# Patient Record
Sex: MALE | Race: WHITE | NOT HISPANIC OR LATINO | Employment: FULL TIME | URBAN - METROPOLITAN AREA
[De-identification: names, ages, dates, MRNs, and addresses within clinical notes are randomized per-mention and may not be internally consistent; named-entity substitution may affect disease eponyms.]

---

## 2017-02-21 RX ORDER — SUMATRIPTAN 100 MG/1
TABLET, FILM COATED ORAL
Qty: 10 TAB | Refills: 4 | Status: SHIPPED | OUTPATIENT
Start: 2017-02-21 | End: 2017-08-04 | Stop reason: SDUPTHER

## 2017-08-04 ENCOUNTER — OFFICE VISIT (OUTPATIENT)
Dept: MEDICAL GROUP | Facility: MEDICAL CENTER | Age: 42
End: 2017-08-04
Payer: COMMERCIAL

## 2017-08-04 VITALS
OXYGEN SATURATION: 98 % | BODY MASS INDEX: 22.9 KG/M2 | HEART RATE: 83 BPM | WEIGHT: 160 LBS | SYSTOLIC BLOOD PRESSURE: 116 MMHG | TEMPERATURE: 98.1 F | HEIGHT: 70 IN | RESPIRATION RATE: 16 BRPM | DIASTOLIC BLOOD PRESSURE: 74 MMHG

## 2017-08-04 DIAGNOSIS — K21.9 GASTROESOPHAGEAL REFLUX DISEASE WITHOUT ESOPHAGITIS: ICD-10-CM

## 2017-08-04 DIAGNOSIS — B35.4 TINEA CORPORIS: ICD-10-CM

## 2017-08-04 DIAGNOSIS — G43.109 MIGRAINE WITH AURA AND WITHOUT STATUS MIGRAINOSUS, NOT INTRACTABLE: ICD-10-CM

## 2017-08-04 PROCEDURE — 99214 OFFICE O/P EST MOD 30 MIN: CPT | Performed by: FAMILY MEDICINE

## 2017-08-04 RX ORDER — KETOCONAZOLE 20 MG/G
1 CREAM TOPICAL 2 TIMES DAILY
Qty: 1 TUBE | Refills: 2 | Status: SHIPPED | OUTPATIENT
Start: 2017-08-04

## 2017-08-04 RX ORDER — SUMATRIPTAN 100 MG/1
100 TABLET, FILM COATED ORAL
Qty: 10 TAB | Refills: 5 | Status: SHIPPED | OUTPATIENT
Start: 2017-08-04 | End: 2018-04-03 | Stop reason: SDUPTHER

## 2017-08-04 RX ORDER — OMEPRAZOLE 40 MG/1
40 CAPSULE, DELAYED RELEASE ORAL DAILY
Qty: 30 CAP | Refills: 0 | Status: SHIPPED | OUTPATIENT
Start: 2017-08-04

## 2017-08-04 ASSESSMENT — PATIENT HEALTH QUESTIONNAIRE - PHQ9
SUM OF ALL RESPONSES TO PHQ QUESTIONS 1-9: 3
5. POOR APPETITE OR OVEREATING: 0 - NOT AT ALL
CLINICAL INTERPRETATION OF PHQ2 SCORE: 1

## 2017-08-04 NOTE — MR AVS SNAPSHOT
"        Mickey Jamison   2017 10:20 AM   Office Visit   MRN: [id number]    Department:  [facility]   Dept Phone:  [phone]    Description:  Male : 1975   Provider:  Silvino Llanos M.D.           Reason for Visit     Medication Refill     Rash           Allergies as of 2017     No Known Allergies      You were diagnosed with     Migraine with aura and without status migrainosus, not intractable   [639336]       Gastroesophageal reflux disease without esophagitis   [592398]       Tinea corporis   [540323]         Vital Signs     Blood Pressure Pulse Temperature Respirations Height Weight    116/74 mmHg 83 36.7 °C (98.1 °F) 16 1.78 m (5' 10.08\") 72.576 kg (160 lb)    Body Mass Index Oxygen Saturation Smoking Status             22.91 kg/m2 98% Never Smoker          Basic Information     Date Of Birth Sex Race Ethnicity Preferred Language    1975 Male White Non- English      Problem List              ICD-10-CM Priority Class Noted - Resolved    Migraine with aura and without status migrainosus, not intractable G43.109   2016 - Present    Tension headache G44.209   2016 - Present    Nasal polyp J33.9   2016 - Present    Seasonal allergies J30.2   2016 - Present    Muscle spasms of neck M62.838   2016 - Present    Gastroesophageal reflux disease without esophagitis K21.9   2017 - Present    Tinea corporis B35.4   2017 - Present      Health Maintenance        Date Due Completion Dates    IMM DTaP/Tdap/Td Vaccine (1 - Tdap) 1996 ---    IMM INFLUENZA (1) 2017 ---            Current Immunizations     No immunizations on file.      Below and/or attached are the medications your provider expects you to take. Review all of your home medications and newly ordered medications with your provider and/or pharmacist. Follow medication instructions as directed by your provider and/or pharmacist. Please keep your medication list with you and share with " your provider. Update the information when medications are discontinued, doses are changed, or new medications (including over-the-counter products) are added; and carry medication information at all times in the event of emergency situations     Allergies:  No Known Allergies          Medications  Valid as of: August 04, 2017 - 11:02 AM    Generic Name Brand Name Tablet Size Instructions for use    Fluticasone Propionate (Suspension) FLONASE 50 MCG/ACT Spray 2 Sprays in nose every day.        Ibuprofen (Tab) MOTRIN 600 MG PRN         Ketoconazole (Cream) NIZORAL 2 % Apply 1 Application to affected area(s) 2 times a day.        Omeprazole (CAPSULE DELAYED RELEASE) PRILOSEC 40 MG Take 1 Cap by mouth every day.        SUMAtriptan Succinate (Tab) IMITREX 100 MG Take 1 Tab by mouth 1 time daily as needed for Migraine.        TiZANidine HCl (Tab) ZANAFLEX 4 MG TAKE 1 TABLET BY MOUTH 2 TIMES A DAY AS NEEDED (MUSCLE SPASMS).        .                 Medicines prescribed today were sent to:     [address] [address]    Phone: [phone] Fax: [phone]    Open 24 Hours?: No      Medication refill instructions:       If your prescription bottle indicates you have medication refills left, it is not necessary to call your provider’s office. Please contact your pharmacy and they will refill your medication.    If your prescription bottle indicates you do not have any refills left, you may request refills at any time through one of the following ways: The online [employer] system (except Urgent Care), by calling your provider’s office, or by asking your pharmacy to contact your provider’s office with a refill request. Medication refills are processed only during regular business hours and may not be available until the next business day. Your provider may request additional information or to have a follow-up visit with you prior to refilling your  medication.   *Please Note: Medication refills are assigned a new Rx number when refilled electronically. Your pharmacy may indicate that no refills were authorized even though a new prescription for the same medication is available at the pharmacy. Please request the medicine by name with the pharmacy before contacting your provider for a refill.           [employer] Access Code: [email or web address]  Expires: 9/3/2017 10:20 AM    [employer]  A secure, online tool to manage your health information     [employer]’s [employer]® is a secure, online tool that connects you to your personalized health information from the privacy of your home -- day or night - making it very easy for you to manage your healthcare. Once the activation process is completed, you can even access your medical information using the [facility] [employer] carlos, which is available for free in the Apple Carlos store or Google Play store.     [employer] provides the following levels of access (as shown below):   My Chart Features   [facility] Primary [facility] Doctor [facility]  Specialists [facility]  Urgent  Care Non-[facility]  Primary Care  Doctor   Email your healthcare team securely and privately 24/7 X X X    Manage appointments: schedule your next appointment; view details of past/upcoming appointments X      Request prescription refills. X      View recent personal medical records, including lab and immunizations X X X X   View health record, including health history, allergies, medications X X X X   Read reports about your outpatient visits, procedures, consult and ER notes X X X X   See your discharge summary, which is a recap of your hospital and/or ER visit that includes your diagnosis, lab results, and care plan. X X       How to register for [employer]:  1. Go to  [email or web address].  2. Click on the Sign Up Now box, which takes you to the New Member Sign Up page. You will need to provide the following information:  a. Enter your [facility] [employer] Access Code exactly as it appears at the  top of this page. (You will not need to use this code after you’ve completed the sign-up process. If you do not sign up before the expiration date, you must request a new code.)   b. Enter your date of birth.   c. Enter your home email address.   d. Click Submit, and follow the next screen’s instructions.  3. Create a [facility] ID. This will be your [facility] login ID and cannot be changed, so think of one that is secure and easy to remember.  4. Create a [facility] password. You can change your password at any time.  5. Enter your Password Reset Question and Answer. This can be used at a later time if you forget your password.   6. Enter your e-mail address. This allows you to receive e-mail notifications when new information is available in [facility].  7. Click Sign Up. You can now view your health information.    For assistance activating your [facility] account, call [phone]

## 2017-08-04 NOTE — PROGRESS NOTES
Subjective:   Mickey Jamison is a 41 y.o. male here today for migraines, GERD, tinea    Migraine with aura and without status migrainosus, not intractable  Using Imitrex 100 mg, half a tablet, 1-2 times per week. There are weeks where he takes it almost daily.  Biggest trigger is not eating on time and tension in neck, related to stress.  Gets pulsing in his eyes from headache.    Tinea corporis  Patient has been affected by tinea in the past, treated with an unknown type of cream. He states he is having recurrent symptoms, but not as severe as previously.    Gastroesophageal reflux disease without esophagitis  Patient has been having increased heartburn over the last 3 weeks. He states that usually he has very mild symptoms that come and go. However recently he also has been having dysphagia that has required him to drink water to get food down the esophagus sometimes. He denies any dark stools or bloody stools or vomiting. Patient is using ranitidine, over-the-counter, which is helping slightly. He is to sit for H. pylori approximately 15 years ago when he had similar symptoms and he was negative.         Current medicines (including changes today)  Current Outpatient Prescriptions   Medication Sig Dispense Refill   • omeprazole (PRILOSEC) 40 MG delayed-release capsule Take 1 Cap by mouth every day. 30 Cap 0   • sumatriptan (IMITREX) 100 MG tablet Take 1 Tab by mouth 1 time daily as needed for Migraine. 10 Tab 5   • ketoconazole (NIZORAL) 2 % Cream Apply 1 Application to affected area(s) 2 times a day. 1 Tube 2   • tizanidine (ZANAFLEX) 4 MG Tab TAKE 1 TABLET BY MOUTH 2 TIMES A DAY AS NEEDED (MUSCLE SPASMS). 60 Tab 5   • fluticasone (FLONASE) 50 MCG/ACT nasal spray Spray 2 Sprays in nose every day. 16 g 5   • IBUPROFEN 600 MG PO TABS PRN        No current facility-administered medications for this visit.     He  has a past medical history of Tinea corporis (8/4/2017). He also has no past medical history of  "Congestive heart failure (CMS-[facility]), Cancer (CMS-HCC), COPD, Diabetes, ASTHMA, CAD (coronary artery disease), Stroke (CMS-[facility]), Seizure disorder (CMS-[facility]), Hypertension, Renal disorder, Liver disease, or Psychiatric disorder.    ROS   No chest pain, no shortness of breath, no abdominal pain       Objective:     Blood pressure 116/74, pulse 83, temperature 36.7 °C (98.1 °F), resp. rate 16, height 1.78 m (5' 10.08\"), weight 72.576 kg (160 lb), SpO2 98 %. Body mass index is 22.91 kg/(m^2).   Physical Exam:  Constitutional: Alert, no distress.  Skin: Warm, dry, good turgor, circular erythematous patch on left flank with raised borders. No drainage or discharge.  Eye: Equal, round and reactive, conjunctiva clear, lids normal.  ENMT: Lips without lesions, good dentition, oropharynx clear.  Psych: Alert and oriented x3, normal affect and mood.        Assessment and Plan:   The following treatment plan was discussed    1. Migraine with aura and without status migrainosus, not intractable  Controlled. Cautioned patient overuse of Imitrex. Refill sent to pharmacy.  - sumatriptan (IMITREX) 100 MG tablet; Take 1 Tab by mouth 1 time daily as needed for Migraine.  Dispense: 10 Tab; Refill: 5    2. Gastroesophageal reflux disease without esophagitis  Trial of omeprazole 40 mg daily for the next 3-4 weeks. If no improvement consider H. pylori test. If negative, consider GI referral.  If he has difficulty coming off of omeprazole 40 mg daily, consider decrease him to omeprazole 20 mg daily.  - omeprazole (PRILOSEC) 40 MG delayed-release capsule; Take 1 Cap by mouth every day.  Dispense: 30 Cap; Refill: 0    3. Tinea corporis  Prescription for ketoconazole.  - ketoconazole (NIZORAL) 2 % Cream; Apply 1 Application to affected area(s) 2 times a day.  Dispense: 1 Tube; Refill: 2      Followup: Return if symptoms worsen or fail to improve.           "

## 2017-08-04 NOTE — ASSESSMENT & PLAN NOTE
Using Imitrex 100 mg, half a tablet, 1-2 times per week. There are weeks where he takes it almost daily.  Biggest trigger is not eating on time and tension in neck, related to stress.  Gets pulsing in his eyes from headache.

## 2017-08-04 NOTE — ASSESSMENT & PLAN NOTE
Patient has been having increased heartburn over the last 3 weeks. He states that usually he has very mild symptoms that come and go. However recently he also has been having dysphagia that has required him to drink water to get food down the esophagus sometimes. He denies any dark stools or bloody stools or vomiting. Patient is using ranitidine, over-the-counter, which is helping slightly. He is to sit for H. pylori approximately 15 years ago when he had similar symptoms and he was negative.

## 2017-08-04 NOTE — ASSESSMENT & PLAN NOTE
Patient has been affected by tinea in the past, treated with an unknown type of cream. He states he is having recurrent symptoms, but not as severe as previously.

## 2018-04-03 ENCOUNTER — OFFICE VISIT (OUTPATIENT)
Dept: MEDICAL GROUP | Facility: MEDICAL CENTER | Age: 43
End: 2018-04-03
Payer: COMMERCIAL

## 2018-04-03 VITALS
OXYGEN SATURATION: 98 % | SYSTOLIC BLOOD PRESSURE: 112 MMHG | HEART RATE: 82 BPM | BODY MASS INDEX: 23.25 KG/M2 | WEIGHT: 162.4 LBS | RESPIRATION RATE: 12 BRPM | DIASTOLIC BLOOD PRESSURE: 82 MMHG | TEMPERATURE: 97.9 F | HEIGHT: 70 IN

## 2018-04-03 DIAGNOSIS — G43.109 MIGRAINE WITH AURA AND WITHOUT STATUS MIGRAINOSUS, NOT INTRACTABLE: ICD-10-CM

## 2018-04-03 DIAGNOSIS — F41.1 GENERALIZED ANXIETY DISORDER: ICD-10-CM

## 2018-04-03 PROBLEM — F41.9 ANXIETY: Status: ACTIVE | Noted: 2018-04-03

## 2018-04-03 PROCEDURE — 99214 OFFICE O/P EST MOD 30 MIN: CPT | Performed by: FAMILY MEDICINE

## 2018-04-03 RX ORDER — SUMATRIPTAN 100 MG/1
100 TABLET, FILM COATED ORAL
Qty: 10 TAB | Refills: 5 | Status: SHIPPED | OUTPATIENT
Start: 2018-04-03 | End: 2019-05-02 | Stop reason: SDUPTHER

## 2018-04-03 NOTE — PROGRESS NOTES
"Subjective:   Mickey Jamison is a 42 y.o. male here today for anxiety    Generalized anxiety disorder  Has been seeing a counselor for the last 1 years or so.  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 Anxiety has been there for a few years, but increased over the last 1 year. Waking up in the middle of the night and can't go back to sleep. Has a sense of impending doom and nervous all the time. Hard to focus at work.  Has been trying meditation, psychologist, and OTC anxiety medication. Has never been on prescription medication.  No depression. No hallucinations or delusions.         Current medicines (including changes today)  Current Outpatient Prescriptions   Medication Sig Dispense Refill   • sertraline (ZOLOFT) 50 MG Tab Take 1 Tab by mouth every day. 30 Tab 11   • sumatriptan (IMITREX) 100 MG tablet Take 1 Tab by mouth 1 time daily as needed for Migraine. 10 Tab 5   • omeprazole (PRILOSEC) 40 MG delayed-release capsule Take 1 Cap by mouth every day. 30 Cap 0   • ketoconazole (NIZORAL) 2 % Cream Apply 1 Application to affected area(s) 2 times a day. 1 Tube 2   • tizanidine (ZANAFLEX) 4 MG Tab TAKE 1 TABLET BY MOUTH 2 TIMES A DAY AS NEEDED (MUSCLE SPASMS). 60 Tab 5   • fluticasone (FLONASE) 50 MCG/ACT nasal spray Spray 2 Sprays in nose every day. 16 g 5   • IBUPROFEN 600 MG PO TABS PRN        No current facility-administered medications for this visit.      He  has a past medical history of Tinea corporis (8/4/2017). He also has no past medical history of ASTHMA; CAD (coronary artery disease); Cancer (CMS-HCC); Congestive heart failure (CMS-HCC); COPD; Diabetes; Hypertension; Liver disease; Psychiatric disorder; Renal disorder; Seizure disorder (CMS-[facility]); or Stroke (CMS-[facility]).    ROS   No depression, no chest pain       Objective:     Blood pressure 112/82, pulse 82, temperature 36.6 °C (97.9 °F), resp. rate 12, height 1.78 m (5' 10.08\"), " weight 73.7 kg (162 lb 6.4 oz), SpO2 98 %. Body mass index is 23.25 kg/m².   Physical Exam:  Constitutional: Alert, no distress.  Skin: Warm, dry, good turgor, no rashes in visible areas.  Eye: Equal, round and reactive, conjunctiva clear, lids normal.  Psych: Alert and oriented x3, anxious.        Assessment and Plan:   The following treatment plan was discussed    1. Generalized anxiety disorder  New problem. Start patient on sertraline 50 mg daily. Reviewed medication and treatment options in full detail. Discussed potential side effects. Patient will sign up for my chart and message me in one month to notify health symptoms are improving. We will either decide to continue current medication at current dose, increase sertraline or change to Lexapro.  Follow-up if patient is having other concerns.    2. Migraine with aura and without status migrainosus, not intractable  Stable. Refill Imitrex.  - sumatriptan (IMITREX) 100 MG tablet; Take 1 Tab by mouth 1 time daily as needed for Migraine.  Dispense: 10 Tab; Refill: 5      Followup: Return if symptoms worsen or fail to improve.

## 2018-04-03 NOTE — ASSESSMENT & PLAN NOTE
Has been seeing a counselor for the last 1 years or so.  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 Anxiety has been there for a few years, but increased over the last 1 year. Waking up in the middle of the night and can't go back to sleep. Has a sense of impending doom and nervous all the time. Hard to focus at work.  Has been trying meditation, psychologist, and OTC anxiety medication. Has never been on prescription medication.  No depression. No hallucinations or delusions.

## 2019-05-02 DIAGNOSIS — G43.109 MIGRAINE WITH AURA AND WITHOUT STATUS MIGRAINOSUS, NOT INTRACTABLE: ICD-10-CM

## 2019-05-03 RX ORDER — SUMATRIPTAN 100 MG/1
TABLET, FILM COATED ORAL
Qty: 10 TAB | Refills: 4 | Status: SHIPPED | OUTPATIENT
Start: 2019-05-03 | End: 2020-02-28 | Stop reason: SDUPTHER

## 2019-07-29 NOTE — TELEPHONE ENCOUNTER
Refill done. Patient is due for annual appointment. Please have patient schedule.  Silvino Llanos M.D.

## 2020-02-28 ENCOUNTER — OFFICE VISIT (OUTPATIENT)
Dept: MEDICAL GROUP | Facility: MEDICAL CENTER | Age: 45
End: 2020-02-28
Payer: COMMERCIAL

## 2020-02-28 VITALS
HEIGHT: 70 IN | BODY MASS INDEX: 23.45 KG/M2 | TEMPERATURE: 97.7 F | DIASTOLIC BLOOD PRESSURE: 64 MMHG | WEIGHT: 163.8 LBS | SYSTOLIC BLOOD PRESSURE: 108 MMHG | OXYGEN SATURATION: 95 % | HEART RATE: 80 BPM

## 2020-02-28 DIAGNOSIS — F41.1 GENERALIZED ANXIETY DISORDER: ICD-10-CM

## 2020-02-28 DIAGNOSIS — G43.109 MIGRAINE WITH AURA AND WITHOUT STATUS MIGRAINOSUS, NOT INTRACTABLE: ICD-10-CM

## 2020-02-28 DIAGNOSIS — Z23 NEED FOR VACCINATION: ICD-10-CM

## 2020-02-28 PROCEDURE — 90715 TDAP VACCINE 7 YRS/> IM: CPT | Performed by: FAMILY MEDICINE

## 2020-02-28 PROCEDURE — 99213 OFFICE O/P EST LOW 20 MIN: CPT | Mod: 25 | Performed by: FAMILY MEDICINE

## 2020-02-28 PROCEDURE — 90471 IMMUNIZATION ADMIN: CPT | Performed by: FAMILY MEDICINE

## 2020-02-28 RX ORDER — TIZANIDINE 4 MG/1
4 TABLET ORAL 2 TIMES DAILY PRN
Qty: 180 TAB | Refills: 3 | Status: SHIPPED | OUTPATIENT
Start: 2020-02-28

## 2020-02-28 RX ORDER — HYDROXYZINE HYDROCHLORIDE 25 MG/1
25-50 TABLET, FILM COATED ORAL NIGHTLY PRN
Qty: 60 TAB | Refills: 11 | Status: SHIPPED | OUTPATIENT
Start: 2020-02-28

## 2020-02-28 RX ORDER — SUMATRIPTAN 100 MG/1
100 TABLET, FILM COATED ORAL
Qty: 9 TAB | Refills: 11 | Status: SHIPPED | OUTPATIENT
Start: 2020-02-28 | End: 2021-03-16 | Stop reason: SDUPTHER

## 2020-02-28 ASSESSMENT — PATIENT HEALTH QUESTIONNAIRE - PHQ9: CLINICAL INTERPRETATION OF PHQ2 SCORE: 0

## 2020-02-28 NOTE — ASSESSMENT & PLAN NOTE
Sertraline 50 mg daily is working well, however it has reduced his libido. Over the last month 3 episodes with difficulty sleeping from stress.  He is requesting something to use as needed to sleep for those few times a month.

## 2020-02-28 NOTE — PROGRESS NOTES
Subjective:   Mickey Jamison is a 44 y.o. male here today for migraines and anxiety    Patient is currently doing consulting work for medical firms.  He is currently not insured.  Last labs were 4 years ago, without any concerns.  We have decided to hold off until possibly next year when he gets insurance for labs.      Generalized anxiety disorder  Sertraline 50 mg daily is working well, however it has reduced his libido. Over the last month 3 episodes with difficulty sleeping from stress.  He is requesting something to use as needed to sleep for those few times a month.    Migraine with aura and without status migrainosus, not intractable  Patient uses about 9 Imitrex 100 mg tablets per month.  He states he wakes up sometimes with a tension headache that will progress to a full-blown migraine.  He is not using any over-the-counter headache medication regularly.         Current medicines (including changes today)  Current Outpatient Medications   Medication Sig Dispense Refill   • sertraline (ZOLOFT) 50 MG Tab Take 1 Tab by mouth every day. 90 Tab 3   • sumatriptan (IMITREX) 100 MG tablet Take 1 Tab by mouth 1 time daily as needed for Migraine. 9 Tab 11   • tizanidine (ZANAFLEX) 4 MG Tab Take 1 Tab by mouth 2 times a day as needed (muscle spasms). 180 Tab 3   • hydrOXYzine HCl (ATARAX) 25 MG Tab Take 1-2 Tabs by mouth at bedtime as needed for Anxiety. 60 Tab 11   • omeprazole (PRILOSEC) 40 MG delayed-release capsule Take 1 Cap by mouth every day. 30 Cap 0   • ketoconazole (NIZORAL) 2 % Cream Apply 1 Application to affected area(s) 2 times a day. 1 Tube 2   • fluticasone (FLONASE) 50 MCG/ACT nasal spray Spray 2 Sprays in nose every day. 16 g 5     No current facility-administered medications for this visit.      He  has a past medical history of Tinea corporis (8/4/2017). He also has no past medical history of ASTHMA, CAD (coronary artery disease), Cancer (HCC), Congestive heart failure (HCC), COPD, Diabetes,  "Hypertension, Liver disease, Psychiatric disorder, Renal disorder, Seizure disorder (HCC), or Stroke (HCC).    ROS   + stress       Objective:     /64 (BP Location: Right arm, Patient Position: Sitting, BP Cuff Size: Adult)   Pulse 80   Temp 36.5 °C (97.7 °F) (Temporal)   Ht 1.778 m (5' 10\")   Wt 74.3 kg (163 lb 12.8 oz)   SpO2 95%  Body mass index is 23.5 kg/m².   Physical Exam:  Constitutional: Alert, no distress.  Skin: Warm, dry, good turgor, no rashes in visible areas.  Eye: Equal, round and reactive, conjunctiva clear, lids normal.  Respiratory: Unlabored respiratory effort, lungs clear to auscultation, no wheezes, no ronchi.  Cardiovascular: Normal S1, S2, no murmur, no edema.  Psych: Alert and oriented x3, normal affect and mood.        Assessment and Plan:   The following treatment plan was discussed    1. Migraine with aura and without status migrainosus, not intractable  Stable.  Refill Imitrex for a year.  - sumatriptan (IMITREX) 100 MG tablet; Take 1 Tab by mouth 1 time daily as needed for Migraine.  Dispense: 9 Tab; Refill: 11    2. Generalized anxiety disorder  Stable.  Continue sertraline at current dose.  We will try hydroxyzine for as needed.  Cautioned patient on drowsiness effects of hydroxyzine.  - sertraline (ZOLOFT) 50 MG Tab; Take 1 Tab by mouth every day.  Dispense: 90 Tab; Refill: 3  - hydrOXYzine HCl (ATARAX) 25 MG Tab; Take 1-2 Tabs by mouth at bedtime as needed for Anxiety.  Dispense: 60 Tab; Refill: 11    3. Need for vaccination  - Tdap Vaccine =>8YO IM      Followup: Return if symptoms worsen or fail to improve.         "

## 2020-02-28 NOTE — ASSESSMENT & PLAN NOTE
Patient uses about 9 Imitrex 100 mg tablets per month.  He states he wakes up sometimes with a tension headache that will progress to a full-blown migraine.  He is not using any over-the-counter headache medication regularly.

## 2021-03-16 DIAGNOSIS — G43.109 MIGRAINE WITH AURA AND WITHOUT STATUS MIGRAINOSUS, NOT INTRACTABLE: ICD-10-CM

## 2021-03-22 RX ORDER — SUMATRIPTAN 100 MG/1
100 TABLET, FILM COATED ORAL
Qty: 9 TABLET | Refills: 0 | Status: SHIPPED | OUTPATIENT
Start: 2021-03-22

## 2021-03-22 NOTE — TELEPHONE ENCOUNTER
Please advise patient to schedule visit and establish with new  PCP. Thanks! Yoanna Ash P.A.-C. covering for Silvino Llanos M.D.